# Patient Record
Sex: FEMALE | Race: WHITE | NOT HISPANIC OR LATINO | Employment: OTHER | URBAN - NONMETROPOLITAN AREA
[De-identification: names, ages, dates, MRNs, and addresses within clinical notes are randomized per-mention and may not be internally consistent; named-entity substitution may affect disease eponyms.]

---

## 2020-03-12 ENCOUNTER — HOSPITAL ENCOUNTER (OUTPATIENT)
Dept: MAMMOGRAPHY | Facility: HOSPITAL | Age: 55
Discharge: HOME | End: 2020-03-12
Admitting: PHYSICIAN ASSISTANT

## 2020-03-12 DIAGNOSIS — R92.8 ABNORMAL MAMMOGRAM: ICD-10-CM

## 2020-03-12 PROCEDURE — G0279 TOMOSYNTHESIS, MAMMO: HCPCS

## 2020-03-12 PROCEDURE — G0279 TOMOSYNTHESIS, MAMMO: HCPCS | Performed by: RADIOLOGY

## 2020-03-12 PROCEDURE — 77065 DX MAMMO INCL CAD UNI: CPT | Performed by: RADIOLOGY

## 2020-03-12 PROCEDURE — 77065 DX MAMMO INCL CAD UNI: CPT

## 2020-03-18 ENCOUNTER — HOSPITAL ENCOUNTER (OUTPATIENT)
Dept: MAMMOGRAPHY | Facility: HOSPITAL | Age: 55
Discharge: HOME | End: 2020-03-18
Admitting: RADIOLOGY

## 2020-03-18 ENCOUNTER — HOSPITAL ENCOUNTER (OUTPATIENT)
Dept: MAMMOGRAPHY | Facility: HOSPITAL | Age: 55
Discharge: HOME | End: 2020-03-18

## 2020-03-18 DIAGNOSIS — R92.8 ABNORMAL MAMMOGRAM: ICD-10-CM

## 2020-03-18 PROCEDURE — A4648 IMPLANTABLE TISSUE MARKER: HCPCS

## 2020-03-18 PROCEDURE — 88305 TISSUE EXAM BY PATHOLOGIST: CPT | Performed by: RADIOLOGY

## 2020-03-18 PROCEDURE — 19081 BX BREAST 1ST LESION STRTCTC: CPT | Performed by: RADIOLOGY

## 2020-03-18 PROCEDURE — 77065 DX MAMMO INCL CAD UNI: CPT | Performed by: RADIOLOGY

## 2020-03-20 ENCOUNTER — TELEPHONE (OUTPATIENT)
Dept: MAMMOGRAPHY | Facility: HOSPITAL | Age: 55
End: 2020-03-20

## 2020-03-20 LAB
LAB AP CASE REPORT: NORMAL
PATH REPORT.FINAL DX SPEC: NORMAL

## 2020-03-20 NOTE — TELEPHONE ENCOUNTER
Attempted phone call to pt. Pt brother in law states pt not home and will give her a message to call back for bx results    ----- Message from Medina Ace MD sent at 3/20/2020  1:36 PM EDT -----  PATHOLOGY:    Sclerosing adenosis. Fibrocystic change, characterized by mild intraductal hyperplasia of usual type, adenosis, apocrine metaplasia, cyst formation and stromal fibrosis. Multiple benign calcifications. No malignancy identified.    The pathology result is concordant with the imaging findings. Recommend 6 month follow-up diagnostic right mammogram.    The patient will be notified of the results and recommendations by our breast care nurse.

## 2020-03-20 NOTE — TELEPHONE ENCOUNTER
Pt given results and 6 mo f/u mammogram    ----- Message from Medina Ace MD sent at 3/20/2020  1:36 PM EDT -----  PATHOLOGY:    Sclerosing adenosis. Fibrocystic change, characterized by mild intraductal hyperplasia of usual type, adenosis, apocrine metaplasia, cyst formation and stromal fibrosis. Multiple benign calcifications. No malignancy identified.    The pathology result is concordant with the imaging findings. Recommend 6 month follow-up diagnostic right mammogram.    The patient will be notified of the results and recommendations by our breast care nurse.

## 2022-07-06 ENCOUNTER — HOSPITAL ENCOUNTER (OUTPATIENT)
Dept: HOSPITAL 79 - KOH-I | Age: 57
End: 2022-07-06
Attending: FAMILY MEDICINE
Payer: MEDICARE

## 2022-07-06 DIAGNOSIS — Z87.891: Primary | ICD-10-CM

## 2022-07-06 DIAGNOSIS — R91.1: ICD-10-CM

## 2022-07-27 ENCOUNTER — HOSPITAL ENCOUNTER (OUTPATIENT)
Dept: HOSPITAL 79 - EXRD | Age: 57
End: 2022-07-27
Attending: FAMILY MEDICINE
Payer: MEDICARE

## 2022-07-27 DIAGNOSIS — Z78.0: ICD-10-CM

## 2022-07-27 DIAGNOSIS — Z13.820: Primary | ICD-10-CM

## 2022-09-14 ENCOUNTER — HOSPITAL ENCOUNTER (OUTPATIENT)
Dept: HOSPITAL 79 - EXRD | Age: 57
End: 2022-09-14
Attending: FAMILY MEDICINE
Payer: MEDICARE

## 2022-09-14 DIAGNOSIS — M54.50: Primary | ICD-10-CM

## 2022-09-14 DIAGNOSIS — M51.36: ICD-10-CM

## 2022-09-15 ENCOUNTER — TRANSCRIBE ORDERS (OUTPATIENT)
Dept: ADMINISTRATIVE | Facility: HOSPITAL | Age: 57
End: 2022-09-15

## 2022-09-15 DIAGNOSIS — M54.50 MIDLINE LOW BACK PAIN, UNSPECIFIED CHRONICITY, UNSPECIFIED WHETHER SCIATICA PRESENT: Primary | ICD-10-CM

## 2022-09-15 DIAGNOSIS — R92.8 ABNORMAL MAMMOGRAM: Primary | ICD-10-CM

## 2022-10-05 ENCOUNTER — APPOINTMENT (OUTPATIENT)
Dept: MAMMOGRAPHY | Facility: HOSPITAL | Age: 57
End: 2022-10-05